# Patient Record
Sex: MALE | Race: WHITE | NOT HISPANIC OR LATINO
[De-identification: names, ages, dates, MRNs, and addresses within clinical notes are randomized per-mention and may not be internally consistent; named-entity substitution may affect disease eponyms.]

---

## 2023-01-03 ENCOUNTER — NON-APPOINTMENT (OUTPATIENT)
Age: 28
End: 2023-01-03

## 2023-01-05 ENCOUNTER — NON-APPOINTMENT (OUTPATIENT)
Age: 28
End: 2023-01-05

## 2023-01-05 DIAGNOSIS — Z78.9 OTHER SPECIFIED HEALTH STATUS: ICD-10-CM

## 2023-01-05 PROBLEM — Z00.00 ENCOUNTER FOR PREVENTIVE HEALTH EXAMINATION: Status: ACTIVE | Noted: 2023-01-05

## 2023-01-10 ENCOUNTER — NON-APPOINTMENT (OUTPATIENT)
Age: 28
End: 2023-01-10

## 2023-01-16 ENCOUNTER — NON-APPOINTMENT (OUTPATIENT)
Age: 28
End: 2023-01-16

## 2023-01-17 ENCOUNTER — APPOINTMENT (OUTPATIENT)
Dept: GASTROENTEROLOGY | Facility: CLINIC | Age: 28
End: 2023-01-17
Payer: COMMERCIAL

## 2023-01-17 ENCOUNTER — LABORATORY RESULT (OUTPATIENT)
Age: 28
End: 2023-01-17

## 2023-01-17 VITALS
DIASTOLIC BLOOD PRESSURE: 76 MMHG | OXYGEN SATURATION: 98 % | HEART RATE: 82 BPM | SYSTOLIC BLOOD PRESSURE: 116 MMHG | HEIGHT: 74 IN | WEIGHT: 210 LBS | BODY MASS INDEX: 26.95 KG/M2

## 2023-01-17 VITALS — TEMPERATURE: 98.2 F

## 2023-01-17 PROCEDURE — 99204 OFFICE O/P NEW MOD 45 MIN: CPT

## 2023-01-17 RX ORDER — HYDROCORTISONE ACETATE 25 MG/1
25 SUPPOSITORY RECTAL
Qty: 24 | Refills: 3 | Status: ACTIVE | COMMUNITY
Start: 2023-01-17 | End: 1900-01-01

## 2023-01-17 NOTE — ASSESSMENT
[FreeTextEntry1] : \par \par 1. Rectal bleeding\par    assoc  anal discomfort :  blood on stool / paper \par \par MAGNO--> anal fissure w small hemorrhoid\par \par R/O IBD\par        stercoral proctitis\par        \par \par check: cbc, esr, crp, cmet, tsh, iron/IBD panel\par \par \par * Discussed previously the  potential complications of thrombosis,  pain,  infection,  swelling, itching,  bleeding --none recently\par Recommendations: \par * Moderate (15-18 grams/ D--> 5-6 grams/meal)  Fiber Diet was reviewed and emphasized\par * 6  --  8 cups of decaffeinated fluid daily was emphasized \par * Sitz Bathes as needed ,  ++ Anusol HC  Suppos    NV  -- was needed\par * No:  Tucks BID,  Balneol Lotion,   Calmoseptine Oint -- was needed ;    can use  Prep H prn\par *  need Colorectal surgical evaluation for possible ablation \par \par \par blood drawn in the office\par \par \par \par \par \par \par \par \par \par \par \par \par \par

## 2023-01-17 NOTE — HISTORY OF PRESENT ILLNESS
[FreeTextEntry1] : \par This HPI  reflects a summary and review of records : including previous and most recent  Labs, body imaging, consults and progress notes, operative and pathology reports, EKG reports, ED records, found in [facility], [email or web address],  [facility] and any additional records brought in by  the patient at the time of the visit.\par \par \par \par PCP:\par \par 26 yo M w h/o Hemorrhoids\par 1/4/23 presented to UC: c/o 2 day h/o pain on sitting, also noted blood and mucous on the stool\par PE: "flesh: hemorrhoid at 6 oclock\par \par Rec: sitz bathes, OTC hemorrhoids prep and GI/Colorectal f/u\par \par 1/17/23    Today: no c/o , CP, SOB/ JARAMILLO, Cough, Wheeze, Palpitations, edema\par \par 1/17/23   Today: feeling better, but still w discomfort at the anal opening, bleeding has stopped\par                 No Abd pain, But BMs: # 1-2 qd-qod w some strain\par \par * Abd pain-->no\par * Nausea--> no\par * Vomit--> no\par * Early satiety--> no\par * Belching--> no\par * Hiccups--> no\par * Regurgitation--> no\par * Acid Taste / Water Brash--> no\par * Ht burn--> no\par * Dysphagia--> no\par * Throat Clearing--> no\par * Hoarseness--> no\par * Post-Nasal Drip--> no\par * Congestion--> no\par * Globus--> no\par * Cough--> no\par * Wheeze / PC-> -no\par * Diarrhea--> no\par * Bloating--> no\par * Strain on Defecation--> some times \par * Incompl Evac--> no\par * Flatulence--> no\par * Gurgling--> no\par * Melena--> no\par * Anorexia--> no\par * Wt. Loss--> no\par \par

## 2023-01-17 NOTE — PHYSICAL EXAM
[Normal Sphincter Tone] : normal sphincter tone [No External Hemorrhoid] : no external hemorrhoids [Occult Blood] : negative occult blood [de-identified] : small fissure at 12 0'oclcock, small skin tag  at 6, & 2nd deg int hem at 5';  stool brown, heme-neg

## 2023-01-18 LAB
ALBUMIN SERPL ELPH-MCNC: 4.5 G/DL
ALP BLD-CCNC: 51 U/L
ALT SERPL-CCNC: 15 U/L
ANION GAP SERPL CALC-SCNC: 13 MMOL/L
AST SERPL-CCNC: 13 U/L
BASOPHILS # BLD AUTO: 0.07 K/UL
BASOPHILS NFR BLD AUTO: 0.9 %
BILIRUB SERPL-MCNC: 0.4 MG/DL
BUN SERPL-MCNC: 13 MG/DL
CALCIUM SERPL-MCNC: 9.5 MG/DL
CHLORIDE SERPL-SCNC: 104 MMOL/L
CO2 SERPL-SCNC: 24 MMOL/L
CREAT SERPL-MCNC: 0.81 MG/DL
CRP SERPL-MCNC: <3 MG/L
EGFR: 125 ML/MIN/1.73M2
EOSINOPHIL # BLD AUTO: 0.08 K/UL
EOSINOPHIL NFR BLD AUTO: 1 %
ERYTHROCYTE [SEDIMENTATION RATE] IN BLOOD BY WESTERGREN METHOD: 2 MM/HR
FERRITIN SERPL-MCNC: 118 NG/ML
GLUCOSE SERPL-MCNC: 114 MG/DL
HCT VFR BLD CALC: 43.2 %
HGB BLD-MCNC: 14.3 G/DL
IMM GRANULOCYTES NFR BLD AUTO: 0.3 %
IRON SATN MFR SERPL: 41 %
IRON SERPL-MCNC: 102 UG/DL
LYMPHOCYTES # BLD AUTO: 1.65 K/UL
LYMPHOCYTES NFR BLD AUTO: 21.6 %
MAN DIFF?: NORMAL
MCHC RBC-ENTMCNC: 30.4 PG
MCHC RBC-ENTMCNC: 33.1 GM/DL
MCV RBC AUTO: 91.7 FL
MONOCYTES # BLD AUTO: 0.35 K/UL
MONOCYTES NFR BLD AUTO: 4.6 %
NEUTROPHILS # BLD AUTO: 5.46 K/UL
NEUTROPHILS NFR BLD AUTO: 71.6 %
PLATELET # BLD AUTO: 298 K/UL
POTASSIUM SERPL-SCNC: 4.5 MMOL/L
PROT SERPL-MCNC: 7.1 G/DL
RBC # BLD: 4.71 M/UL
RBC # FLD: 13.2 %
SODIUM SERPL-SCNC: 142 MMOL/L
TIBC SERPL-MCNC: 251 UG/DL
TSH SERPL-ACNC: 0.86 UIU/ML
UIBC SERPL-MCNC: 149 UG/DL
WBC # FLD AUTO: 7.63 K/UL

## 2023-01-19 LAB
BAKER'S YEAST AB QL: 9.6 UNITS
BAKER'S YEAST IGA QL IA: <5 UNITS
BAKER'S YEAST IGA QN IA: NEGATIVE
BAKER'S YEAST IGG QN IA: NEGATIVE

## 2023-01-31 ENCOUNTER — APPOINTMENT (OUTPATIENT)
Dept: SURGERY | Facility: CLINIC | Age: 28
End: 2023-01-31
Payer: COMMERCIAL

## 2023-01-31 ENCOUNTER — NON-APPOINTMENT (OUTPATIENT)
Age: 28
End: 2023-01-31

## 2023-01-31 VITALS
WEIGHT: 210 LBS | RESPIRATION RATE: 18 BRPM | HEIGHT: 74 IN | SYSTOLIC BLOOD PRESSURE: 136 MMHG | DIASTOLIC BLOOD PRESSURE: 86 MMHG | HEART RATE: 76 BPM | BODY MASS INDEX: 26.95 KG/M2 | OXYGEN SATURATION: 99 %

## 2023-01-31 DIAGNOSIS — K60.2 ANAL FISSURE, UNSPECIFIED: ICD-10-CM

## 2023-01-31 DIAGNOSIS — K64.8 OTHER HEMORRHOIDS: ICD-10-CM

## 2023-01-31 DIAGNOSIS — K62.5 HEMORRHAGE OF ANUS AND RECTUM: ICD-10-CM

## 2023-01-31 PROCEDURE — 46600 DIAGNOSTIC ANOSCOPY SPX: CPT

## 2023-01-31 PROCEDURE — 99202 OFFICE O/P NEW SF 15 MIN: CPT | Mod: 25

## 2023-01-31 NOTE — HISTORY OF PRESENT ILLNESS
[FreeTextEntry1] : 7-year-old male here for initial evaluation referred by Dr. Hamilton.  Patient was seen for rectal bleeding as well as anal discomfort found to have a posterior midline fissure as well as some hemorrhoids.  Patient feels like he has relatively high fiber diet.  Denies significant straining.  Occasional bleeding usually every other week with bowel movements.  Has been having discomfort after bowel movements which is improved with a hydrocortisone suppository.  No other medical treatment for a fissure at this time.  No prior procedures for hemorrhoids or fissures.  No prior colonoscopy.  No family history of inflammatory bowel disease.

## 2023-01-31 NOTE — PROCEDURE
[FreeTextEntry1] : Anoscopy: Anoscopic exam performed with lighted anoscope.  Relatively flat internal hemorrhoids without any stigmata of bleeding.  No inflammation.  No other anorectal mucosal abnormalities

## 2023-01-31 NOTE — ASSESSMENT
[FreeTextEntry1] : Mr. KOROMA is a 27 year male who presents for symptomatic hemorrhoids, and healing anal fissure\par \par Discussed management options for hemorrhoids including medical management with fiber supplements, increased fluid intake, and symptom management during acute flares, office based procedures including rubber band ligation, transanal hemorrhoidal dearterialization, and surgical excision. \par \par Given the findings today on exam recommend starting with medical management of hemorrhoids.\par Recommend daily fiber supplement such as Metamucil with 1 scoop in a glass of water in the morning. Daily fiber intake recommendation is 30 gm. \par Recommend 6-8 glasses of noncaffeinated beverage daily to help with constipation symptoms. \par \par If symptoms do not improve or return recommend follow up in 6-8 weeks.\par \par If patient has persistent discomfort with fissure not healing completely recommended return for follow-up to reassess and consider medical management with lidocaine nifedipine cream at that point.\par

## 2023-01-31 NOTE — PHYSICAL EXAM
[Abdomen Masses] : No abdominal masses [Abdomen Tenderness] : ~T No ~M abdominal tenderness [Excoriation] : no perianal excoriation [Tender, Swollen] : nontender, non-swollen [Normal] : was normal [None] : there was no rectal abscess [JVD] : no jugular venous distention  [Respiratory Effort] : normal respiratory effort [No Rash or Lesion] : No rash or lesion [Alert] : alert [Calm] : calm [de-identified] : Nearly completely healed posterior midline fissure [de-identified] : No acute distress